# Patient Record
Sex: MALE | Race: WHITE | NOT HISPANIC OR LATINO | URBAN - METROPOLITAN AREA
[De-identification: names, ages, dates, MRNs, and addresses within clinical notes are randomized per-mention and may not be internally consistent; named-entity substitution may affect disease eponyms.]

---

## 2023-07-07 ENCOUNTER — OFFICE VISIT (OUTPATIENT)
Dept: PRIMARY CARE | Facility: CLINIC | Age: 48
End: 2023-07-07
Payer: COMMERCIAL

## 2023-07-07 VITALS
BODY MASS INDEX: 30.77 KG/M2 | HEIGHT: 77 IN | WEIGHT: 260.6 LBS | DIASTOLIC BLOOD PRESSURE: 88 MMHG | HEART RATE: 75 BPM | SYSTOLIC BLOOD PRESSURE: 144 MMHG | OXYGEN SATURATION: 97 %

## 2023-07-07 DIAGNOSIS — E78.2 MIXED HYPERLIPIDEMIA: ICD-10-CM

## 2023-07-07 DIAGNOSIS — I10 HYPERTENSION, UNSPECIFIED TYPE: Primary | ICD-10-CM

## 2023-07-07 PROBLEM — J30.2 SEASONAL ALLERGIES: Status: RESOLVED | Noted: 2018-12-21 | Resolved: 2023-07-07

## 2023-07-07 PROBLEM — K21.9 GERD (GASTROESOPHAGEAL REFLUX DISEASE): Status: ACTIVE | Noted: 2017-07-06

## 2023-07-07 PROBLEM — E78.00 PURE HYPERCHOLESTEROLEMIA: Status: ACTIVE | Noted: 2023-02-11

## 2023-07-07 PROBLEM — E66.811 OBESITY, CLASS I, BMI 30-34.9: Status: ACTIVE | Noted: 2022-06-28

## 2023-07-07 PROBLEM — J02.9 SORE THROAT: Status: RESOLVED | Noted: 2023-07-07 | Resolved: 2023-07-07

## 2023-07-07 PROBLEM — J30.9 ALLERGIC RHINITIS: Status: ACTIVE | Noted: 2023-07-07

## 2023-07-07 PROBLEM — J30.9 ALLERGIC RHINITIS: Status: RESOLVED | Noted: 2023-07-07 | Resolved: 2023-07-07

## 2023-07-07 PROBLEM — K21.9 GASTROESOPHAGEAL REFLUX DISEASE: Status: ACTIVE | Noted: 2023-07-07

## 2023-07-07 PROBLEM — J30.2 SEASONAL ALLERGIES: Status: ACTIVE | Noted: 2018-12-21

## 2023-07-07 PROBLEM — J02.9 SORE THROAT: Status: ACTIVE | Noted: 2023-07-07

## 2023-07-07 PROBLEM — J01.90 ACUTE SINUSITIS: Status: ACTIVE | Noted: 2023-07-07

## 2023-07-07 PROBLEM — E78.00 PURE HYPERCHOLESTEROLEMIA: Status: RESOLVED | Noted: 2023-02-11 | Resolved: 2023-07-07

## 2023-07-07 PROBLEM — J01.90 ACUTE SINUSITIS: Status: RESOLVED | Noted: 2023-07-07 | Resolved: 2023-07-07

## 2023-07-07 PROBLEM — E78.00 HIGH BLOOD CHOLESTEROL: Status: ACTIVE | Noted: 2023-07-07

## 2023-07-07 PROBLEM — E78.00 HIGH BLOOD CHOLESTEROL: Status: RESOLVED | Noted: 2023-07-07 | Resolved: 2023-07-07

## 2023-07-07 PROBLEM — K21.9 GERD (GASTROESOPHAGEAL REFLUX DISEASE): Status: RESOLVED | Noted: 2017-07-06 | Resolved: 2023-07-07

## 2023-07-07 PROBLEM — E66.9 OBESITY, CLASS I, BMI 30-34.9: Status: ACTIVE | Noted: 2022-06-28

## 2023-07-07 PROCEDURE — 3077F SYST BP >= 140 MM HG: CPT | Performed by: INTERNAL MEDICINE

## 2023-07-07 PROCEDURE — 99203 OFFICE O/P NEW LOW 30 MIN: CPT | Performed by: INTERNAL MEDICINE

## 2023-07-07 PROCEDURE — 3079F DIAST BP 80-89 MM HG: CPT | Performed by: INTERNAL MEDICINE

## 2023-07-07 RX ORDER — OMEPRAZOLE 20 MG/1
20 CAPSULE, DELAYED RELEASE ORAL DAILY
COMMUNITY
End: 2024-01-17 | Stop reason: SDUPTHER

## 2023-07-07 RX ORDER — AMLODIPINE BESYLATE 5 MG/1
5 TABLET ORAL DAILY
Qty: 90 TABLET | Refills: 2 | Status: SHIPPED | OUTPATIENT
Start: 2023-07-07 | End: 2024-03-26

## 2023-07-07 RX ORDER — FLUTICASONE PROPIONATE 50 MCG
1 SPRAY, SUSPENSION (ML) NASAL
COMMUNITY
Start: 2022-08-18

## 2023-07-07 RX ORDER — EZETIMIBE 10 MG/1
10 TABLET ORAL DAILY
COMMUNITY
End: 2023-07-07 | Stop reason: ALTCHOICE

## 2023-07-07 RX ORDER — MONTELUKAST SODIUM 10 MG/1
10 TABLET ORAL NIGHTLY
COMMUNITY

## 2023-07-07 RX ORDER — ATORVASTATIN CALCIUM 10 MG/1
10 TABLET, FILM COATED ORAL DAILY
Qty: 90 TABLET | Refills: 2 | Status: SHIPPED | OUTPATIENT
Start: 2023-07-07 | End: 2024-03-26

## 2023-07-07 RX ORDER — HYDROCHLOROTHIAZIDE 25 MG/1
25 TABLET ORAL DAILY
COMMUNITY
End: 2023-07-07 | Stop reason: ALTCHOICE

## 2023-07-07 RX ORDER — AMLODIPINE BESYLATE 5 MG/1
5 TABLET ORAL DAILY
COMMUNITY
End: 2023-07-07 | Stop reason: SDUPTHER

## 2023-07-07 RX ORDER — ATORVASTATIN CALCIUM 10 MG/1
10 TABLET, FILM COATED ORAL DAILY
COMMUNITY
End: 2023-07-07 | Stop reason: SDUPTHER

## 2023-07-07 RX ORDER — LORATADINE 10 MG/1
10 TABLET ORAL DAILY
COMMUNITY

## 2023-07-07 ASSESSMENT — PATIENT HEALTH QUESTIONNAIRE - PHQ9
SUM OF ALL RESPONSES TO PHQ9 QUESTIONS 1 AND 2: 0
2. FEELING DOWN, DEPRESSED OR HOPELESS: NOT AT ALL
1. LITTLE INTEREST OR PLEASURE IN DOING THINGS: NOT AT ALL

## 2023-07-07 NOTE — PROGRESS NOTES
"Subjective   Patient ID: CONCHITA Long is a 47 y.o. male who presents for the following    Previous patient of [facility] of Dr Reynaga, here to establish    Assessment/Plan   Htn: stable, go back on amlodipine     Hld: will switch back to atorvastatin     Reduced orgasm: recommend not have any intercourse for a week and then see what happens.     Needs fasting blood work and PSA drawn at next appointment in 3-4 months    HPI    HTN: patient denies any headaches, blurred vision or dizziness. patient denies any stroke like symptoms    HLD: Patient denies any muscle aches and is tolerating statin therapy    Reduced orgasms now. Messing with his head. He can have erection and he can ejaculate but the feeling afterward has changed. He did have his amlodipine switched to hydrochlorothiazide and atorvastatin switched to zetia without it helping. It does sound that he is frequently having intercourse.      social: patient denies any smoking, drug or alcohol abuse    family history: sister marysol liver disease bipolar disease, passed; mom and dad alive     surgical history: left biceps tendon       Visit Vitals  /88   Pulse 75   Ht 1.956 m (6' 5\")   Wt 118 kg (260 lb 9.6 oz)   SpO2 97%   BMI 30.90 kg/m²   Smoking Status Every Day   BSA 2.53 m²     PHYSICAL EXAM   General appearance: Alert and in no acute distress. speech is clear and coherent  HEENT: Sclera and conjunctiva white, EOMI, uvela midline, no mouth lesions. PERRLA,  nasal turbinates are not swollen without exudate. TM's Wooten with cone of light, external ear canal with scant cerumen. No head trauma  Neck: no carotid bruits or thyromegaly. no lymphadenopathy   Respiratory : No respiratory distress, normal respiratory rhythm and effort. Clear bilateral breath sounds. No wheezing or rhonchi.   Cardiovascular: heart rate regular, S1, S2. no murmurs. no Lower extremity edema  Skin inspection: Normal skin color and pigmentation, normal skin turgor and no visible rash, " induration, or cellulitis  MSK: 5/5 strength upper and lower extremities without gait abnormalities. no loss of muscle mass   Neuro: 2-12 CN grossly intact.  no slurred speech. no lateralizing deficit  Psychiatric Orientation: Oriented to person, place, and time. no depression, homicidal or suicidal thoughts, normal affect       REVIEW OF SYSTEMS   Constitutional: not feeling tired and no fever, chills or sweats. Denies weight loss    HEENT: no earache and no sore throat. no blurred vision and or double vision. no headache  Cardiovascular: no exertional chest pain, no palpitations, no lower extremity edema    Lungs: Denies shortness of breath, exertional dyspnea, wheezing  Gastrointestinal: no change in bowel habits, no diarrhea, no nausea, no vomiting and no abdominal pain. Denies Melena, brbpr or dark stool  Musculoskeletal: no myalgias, no muscle weakness and no limb swelling.    Neurological: no headaches, no seizures, no numbness, no lateralizing deficits and no fainting.   Psychiatric: no depression and no anxiety.   Urine: denies polyuria, hematuria, dysuria        No Known Allergies    Current Outpatient Medications   Medication Sig Dispense Refill    amLODIPine (Norvasc) 5 mg tablet Take 1 tablet (5 mg) by mouth once daily.      atorvastatin (Lipitor) 10 mg tablet Take 1 tablet (10 mg) by mouth once daily.      ezetimibe (Zetia) 10 mg tablet Take 1 tablet (10 mg) by mouth once daily.      fluticasone (Flonase) 50 mcg/actuation nasal spray 1 spray by Does not apply route once daily.      hydroCHLOROthiazide (HYDRODiuril) 25 mg tablet Take 1 tablet (25 mg) by mouth once daily.      loratadine (Claritin) 10 mg tablet Take 1 tablet (10 mg) by mouth once daily.      montelukast (Singulair) 10 mg tablet Take 1 tablet (10 mg) by mouth once daily at bedtime.      omeprazole (PriLOSEC) 20 mg DR capsule Take 1 capsule (20 mg) by mouth once daily.       No current facility-administered medications for this visit.        Objective     No visits with results within 4 Month(s) from this visit.   Latest known visit with results is:   No results found for any previous visit.       Radiology: Reviewed imaging in powerchart.  No results found.    No family history on file.  Social History     Socioeconomic History    Marital status:      Spouse name: None    Number of children: None    Years of education: None    Highest education level: None   Occupational History    None   Tobacco Use    Smoking status: Every Day     Packs/day: 0.25     Years: 25.00     Total pack years: 6.25     Types: Cigarettes    Smokeless tobacco: Never   Substance and Sexual Activity    Alcohol use: Yes     Alcohol/week: 10.0 standard drinks of alcohol     Types: 10 Standard drinks or equivalent per week    Drug use: Never    Sexual activity: None   Other Topics Concern    None   Social History Narrative    None     Social Determinants of Health     Financial Resource Strain: Not on file   Food Insecurity: Not on file   Transportation Needs: Not on file   Physical Activity: Not on file   Stress: Not on file   Social Connections: Not on file   Intimate Partner Violence: Not on file   Housing Stability: Not on file     No past medical history on file.  No past surgical history on file.    Charting was completed using voice recognition technology and may include unintended errors.

## 2023-09-29 DIAGNOSIS — Z00.00 HEALTH CARE MAINTENANCE: ICD-10-CM

## 2023-09-29 LAB
NON-UH HIE CALCULATED LDL CHOLESTEROL: 114 MG/DL (ref 60–130)
NON-UH HIE CHOLESTEROL: 178 MG/DL (ref 100–200)
NON-UH HIE HDL CHOLESTEROL: 42 MG/DL (ref 40–60)
NON-UH HIE PROSTATIC SPECIFIC AG SCREEN: 0.5 NG/ML (ref 0–4)
NON-UH HIE TOTAL CHOL/HDL CHOL RATIO: 4.2
NON-UH HIE TRIGLYCERIDES: 110 MG/DL (ref 30–150)

## 2023-11-02 ASSESSMENT — PROMIS GLOBAL HEALTH SCALE
CARRYOUT_PHYSICAL_ACTIVITIES: COMPLETELY
RATE_SOCIAL_SATISFACTION: VERY GOOD
RATE_AVERAGE_PAIN: 0
EMOTIONAL_PROBLEMS: RARELY
RATE_PHYSICAL_HEALTH: VERY GOOD
RATE_QUALITY_OF_LIFE: VERY GOOD
RATE_MENTAL_HEALTH: VERY GOOD
RATE_GENERAL_HEALTH: VERY GOOD
CARRYOUT_SOCIAL_ACTIVITIES: EXCELLENT

## 2023-11-03 ENCOUNTER — OFFICE VISIT (OUTPATIENT)
Dept: PRIMARY CARE | Facility: CLINIC | Age: 48
End: 2023-11-03
Payer: COMMERCIAL

## 2023-11-03 VITALS
HEART RATE: 68 BPM | TEMPERATURE: 97.1 F | WEIGHT: 268.4 LBS | OXYGEN SATURATION: 98 % | HEIGHT: 77 IN | BODY MASS INDEX: 31.69 KG/M2

## 2023-11-03 DIAGNOSIS — Z00.00 HEALTH CARE MAINTENANCE: ICD-10-CM

## 2023-11-03 DIAGNOSIS — Z00.00 ANNUAL PHYSICAL EXAM: Primary | ICD-10-CM

## 2023-11-03 LAB
NON-UH HIE A/G RATIO: 1.6
NON-UH HIE ALB: 4.3 G/DL (ref 3.4–5)
NON-UH HIE ALK PHOS: 56 UNIT/L (ref 45–117)
NON-UH HIE BILIRUBIN, TOTAL: 0.7 MG/DL (ref 0.3–1.2)
NON-UH HIE BUN/CREAT RATIO: 13.6
NON-UH HIE BUN: 15 MG/DL (ref 9–23)
NON-UH HIE CALCIUM: 9.6 MG/DL (ref 8.7–10.4)
NON-UH HIE CALCULATED OSMOLALITY: 284 MOSM/KG (ref 275–295)
NON-UH HIE CHLORIDE: 106 MMOL/L (ref 98–107)
NON-UH HIE CO2, VENOUS: 27 MMOL/L (ref 20–31)
NON-UH HIE CREATININE: 1.1 MG/DL (ref 0.6–1.1)
NON-UH HIE GFR AA: >60
NON-UH HIE GLOBULIN: 2.7 G/DL
NON-UH HIE GLOMERULAR FILTRATION RATE: >60 ML/MIN/1.73M?
NON-UH HIE GLUCOSE: 106 MG/DL (ref 74–106)
NON-UH HIE GOT: 24 UNIT/L (ref 15–37)
NON-UH HIE GPT: 37 UNIT/L (ref 10–49)
NON-UH HIE HCT: 46.2 % (ref 41–52)
NON-UH HIE HGB A1C: 5.1 %
NON-UH HIE HGB: 16 G/DL (ref 13.5–17.5)
NON-UH HIE INSTR WBC ND: 4
NON-UH HIE K: 4.8 MMOL/L (ref 3.5–5.1)
NON-UH HIE MCH: 32.4 PG (ref 27–34)
NON-UH HIE MCHC: 34.6 G/DL (ref 32–37)
NON-UH HIE MCV: 93.6 FL (ref 80–100)
NON-UH HIE MPV: 8.1 FL (ref 7.4–10.4)
NON-UH HIE NA: 142 MMOL/L (ref 135–145)
NON-UH HIE PLATELET: 205 X10 (ref 150–450)
NON-UH HIE RBC: 4.93 X10 (ref 4.7–6.1)
NON-UH HIE RDW: 13.2 % (ref 11.5–14.5)
NON-UH HIE TOTAL PROTEIN: 7 G/DL (ref 5.7–8.2)
NON-UH HIE TSH: 2.05 UIU/ML (ref 0.55–4.78)
NON-UH HIE VIT D 25: 25 NG/ML
NON-UH HIE WBC: 4 X10 (ref 4.5–11)

## 2023-11-03 PROCEDURE — 3077F SYST BP >= 140 MM HG: CPT | Performed by: INTERNAL MEDICINE

## 2023-11-03 PROCEDURE — 90471 IMMUNIZATION ADMIN: CPT | Performed by: INTERNAL MEDICINE

## 2023-11-03 PROCEDURE — 90686 IIV4 VACC NO PRSV 0.5 ML IM: CPT | Performed by: INTERNAL MEDICINE

## 2023-11-03 PROCEDURE — 93000 ELECTROCARDIOGRAM COMPLETE: CPT | Performed by: INTERNAL MEDICINE

## 2023-11-03 PROCEDURE — 99396 PREV VISIT EST AGE 40-64: CPT | Performed by: INTERNAL MEDICINE

## 2023-11-03 PROCEDURE — 3079F DIAST BP 80-89 MM HG: CPT | Performed by: INTERNAL MEDICINE

## 2023-11-03 NOTE — PROGRESS NOTES
"Subjective   Patient ID: CONCHITA Long is a 47 y.o. male who presents for the following    PHYSICAL 11/3/2023    Previous patient of [facility] of Dr Reynaga, here to establish    Assessment/Plan   HTN: stable on amlodipine.     HLD: stable on lipitor.     Reduced orgasm:  reassured the patient. No ED present at this time    Currently smoking, he knows he needs to quite    CBC, CMP, lipid panel, a1c, tsh, vitamin d  psa    HPI  Male with htn, hld     HTN: patient denies any headaches, blurred vision or dizziness. patient denies any stroke like symptoms    HLD: Patient denies any muscle aches and is tolerating statin therapy     social: patient denies any smoking, drug or alcohol abuse. Scotch nightly     family history: sister marysol liver disease bipolar disease, passed; mom and dad alive     surgical history: left biceps tendon      with two 7 year old boys    Visit Vitals  Pulse 68   Temp 36.2 °C (97.1 °F)   Ht 1.956 m (6' 5\")   Wt 122 kg (268 lb 6.4 oz)   SpO2 98%   BMI 31.83 kg/m²   Smoking Status Every Day   BSA 2.57 m²     PHYSICAL EXAM   General appearance: Alert and in no acute distress. speech is clear and coherent  HEENT: Sclera and conjunctiva white, EOMI, uvela midline, no mouth lesions. PERRLA,  nasal turbinates are not swollen without exudate. TM's Wooten with cone of light, external ear canal with scant cerumen. No head trauma  Neck: no carotid bruits or thyromegaly. no lymphadenopathy   Respiratory : No respiratory distress, normal respiratory rhythm and effort. Clear bilateral breath sounds. No wheezing or rhonchi.   Cardiovascular: heart rate regular, S1, S2. no murmurs. no Lower extremity edema  Skin inspection: Normal skin color and pigmentation, normal skin turgor and no visible rash, induration, or cellulitis  MSK: 5/5 strength upper and lower extremities without gait abnormalities. no loss of muscle mass   Neuro: 2-12 CN grossly intact.  no slurred speech. no lateralizing deficit  Psychiatric " Orientation: Oriented to person, place, and time. no depression, homicidal or suicidal thoughts, normal affect  Abdomen: soft, none tender, none distended. no organomegaly      REVIEW OF SYSTEMS   Constitutional: not feeling tired and no fever, chills or sweats. Denies weight loss    HEENT: no earache and no sore throat. no blurred vision and or double vision. no headache  Cardiovascular: no exertional chest pain, no palpitations, no lower extremity edema    Lungs: Denies shortness of breath, exertional dyspnea, wheezing  Gastrointestinal: no change in bowel habits, no diarrhea, no nausea, no vomiting and no abdominal pain. Denies Melena, brbpr or dark stool  Musculoskeletal: no myalgias, no muscle weakness and no limb swelling.    Neurological: no headaches, no seizures, no numbness, no lateralizing deficits and no fainting.   Psychiatric: no depression and no anxiety.   Urine: denies polyuria, hematuria, dysuria        No Known Allergies    Current Outpatient Medications   Medication Sig Dispense Refill    amLODIPine (Norvasc) 5 mg tablet Take 1 tablet (5 mg) by mouth once daily. 90 tablet 2    atorvastatin (Lipitor) 10 mg tablet Take 1 tablet (10 mg) by mouth once daily. 90 tablet 2    fluticasone (Flonase) 50 mcg/actuation nasal spray 1 spray by Does not apply route once daily.      loratadine (Claritin) 10 mg tablet Take 1 tablet (10 mg) by mouth once daily.      montelukast (Singulair) 10 mg tablet Take 1 tablet (10 mg) by mouth once daily at bedtime.      omeprazole (PriLOSEC) 20 mg DR capsule Take 1 capsule (20 mg) by mouth once daily.       No current facility-administered medications for this visit.       Objective     Orders Only on 09/29/2023   Component Date Value Ref Range Status    [facility] Prostatic Specific Ag S* 09/29/2023 0.5  0.0 - 4.0 ng/mL Final    [facility] Total Chol/HDL Chol Rat* 09/29/2023 4.2   Final    [facility] Cholesterol 09/29/2023 178  100 - 200 mg/dL Final    [facility]  Calculated LDL Choleste* 09/29/2023 114  60 - 130 mg/dL Final    [facility] Triglycerides 09/29/2023 110  30 - 150 mg/dL Final    [facility] HDL Cholesterol 09/29/2023 42  40 - 60 mg/dL Final       Radiology: Reviewed imaging in powerchart.  No results found.    No family history on file.  Social History     Socioeconomic History    Marital status:      Spouse name: None    Number of children: None    Years of education: None    Highest education level: None   Occupational History    None   Tobacco Use    Smoking status: Every Day     Packs/day: 0.25     Years: 25.00     Additional pack years: 0.00     Total pack years: 6.25     Types: Cigarettes    Smokeless tobacco: Never   Substance and Sexual Activity    Alcohol use: Yes     Alcohol/week: 10.0 standard drinks of alcohol     Types: 10 Standard drinks or equivalent per week    Drug use: Never    Sexual activity: None   Other Topics Concern    None   Social History Narrative    None     Social Determinants of Health     Financial Resource Strain: Not on file   Food Insecurity: Not on file   Transportation Needs: Not on file   Physical Activity: Not on file   Stress: Not on file   Social Connections: Not on file   Intimate Partner Violence: Not on file   Housing Stability: Not on file     No past medical history on file.  No past surgical history on file.    Charting was completed using voice recognition technology and may include unintended errors.

## 2023-11-06 DIAGNOSIS — Z80.0 FAMILY HISTORY OF COLON CANCER: ICD-10-CM

## 2023-12-11 ENCOUNTER — TELEPHONE (OUTPATIENT)
Dept: PRIMARY CARE | Facility: CLINIC | Age: 48
End: 2023-12-11
Payer: COMMERCIAL

## 2023-12-12 NOTE — TELEPHONE ENCOUNTER
----- Message from Randy Long sent at 12/12/2023  1:02 PM EST -----  Regarding: Gastroenterologist   Contact: [phone]  Sorry to bother you about this, but when at my appointment with Dr. Pearson's office my latest EKG came up....he suggested I talk to you to get it redone to make sure before I go under the anesthesia for endoscopy/colonoscopy?

## 2024-01-08 ENCOUNTER — OFFICE VISIT (OUTPATIENT)
Dept: PRIMARY CARE | Facility: CLINIC | Age: 49
End: 2024-01-08
Payer: COMMERCIAL

## 2024-01-08 VITALS
OXYGEN SATURATION: 98 % | DIASTOLIC BLOOD PRESSURE: 86 MMHG | BODY MASS INDEX: 31.88 KG/M2 | SYSTOLIC BLOOD PRESSURE: 136 MMHG | HEART RATE: 111 BPM | HEIGHT: 77 IN | WEIGHT: 270 LBS

## 2024-01-08 DIAGNOSIS — I10 HYPERTENSION, UNSPECIFIED TYPE: Primary | ICD-10-CM

## 2024-01-08 PROCEDURE — 3075F SYST BP GE 130 - 139MM HG: CPT | Performed by: INTERNAL MEDICINE

## 2024-01-08 PROCEDURE — 3079F DIAST BP 80-89 MM HG: CPT | Performed by: INTERNAL MEDICINE

## 2024-01-08 PROCEDURE — 93000 ELECTROCARDIOGRAM COMPLETE: CPT | Performed by: INTERNAL MEDICINE

## 2024-01-08 PROCEDURE — 99212 OFFICE O/P EST SF 10 MIN: CPT | Performed by: INTERNAL MEDICINE

## 2024-01-08 NOTE — PROGRESS NOTES
"Subjective   Patient ID: CONCHITA Long is a 48 y.o. male who presents for the following    Follow up on EKG  PHYSICAL 11/3/2023    Previous patient of [facility] of Dr Reynaga, here to establish    Assessment/Plan   Poor \"r wave\" progression (likely due to body habitus): patient has no signs of hear heat failure or chest pain or pressure. repeat EKG is consistent with previous He is okay to get endoscopy     Other medical issues, see below  HTN: stable on amlodipine.     HLD: stable on lipitor.     Reduced orgasm:  reassured the patient. No ED present at this time        HPI  Male with htn, hld     Patient went to see Gi doctor who plans to do egd and colonoscopy. Patient brought up that the EKG on his physical was abnormal (poor r wave progression). This likely is due to lead placement and distance form the chest wall as patient is physical larger in the chest.     HTN: patient denies any headaches, blurred vision or dizziness. patient denies any stroke like symptoms    HLD: Patient denies any muscle aches and is tolerating statin therapy     social: patient denies any smoking, drug or alcohol abuse. Scotch nightly     family history: sister marysol liver disease bipolar disease, passed; mom and dad alive     surgical history: left biceps tendon      with two 7 year old boys    Visit Vitals  /86   Pulse (!) 111   Ht 1.956 m (6' 5\")   Wt 122 kg (270 lb)   SpO2 98%   BMI 32.02 kg/m²   Smoking Status Every Day   BSA 2.57 m²     PHYSICAL EXAM   General appearance: Alert and in no acute distress. speech is clear and coherent  HEENT: Sclera and conjunctiva white, EOMI,    Respiratory : No respiratory distress, normal respiratory rhythm and effort. Clear bilateral breath sounds. No wheezing or rhonchi.   Cardiovascular: heart rate regular, S1, S2. no murmurs. no Lower extremity edema   MSK: 5/5 strength upper and lower extremities without gait abnormalities. no loss of muscle mass   Neuro: 2-12 CN grossly intact.  no " slurred speech. no lateralizing deficit  Psychiatric Orientation: Oriented to person, place, and time. no depression, homicidal or suicidal thoughts, normal affect       REVIEW OF SYSTEMS   Constitutional: not feeling tired and no fever, chills or sweats.  no headache  Cardiovascular: no exertional chest pain, no palpitations, no lower extremity edema    Lungs: Denies shortness of breath, exertional dyspnea, wheezing  Gastrointestinal: no change in bowel habits, no diarrhea, no nausea,    Psychiatric: no depression and no anxiety.   Urine: denies polyuria, hematuria, dysuria        No Known Allergies    Current Outpatient Medications   Medication Sig Dispense Refill    amLODIPine (Norvasc) 5 mg tablet Take 1 tablet (5 mg) by mouth once daily. 90 tablet 2    atorvastatin (Lipitor) 10 mg tablet Take 1 tablet (10 mg) by mouth once daily. 90 tablet 2    fluticasone (Flonase) 50 mcg/actuation nasal spray 1 spray by Does not apply route once daily.      loratadine (Claritin) 10 mg tablet Take 1 tablet (10 mg) by mouth once daily.      montelukast (Singulair) 10 mg tablet Take 1 tablet (10 mg) by mouth once daily at bedtime.      omeprazole (PriLOSEC) 20 mg DR capsule Take 1 capsule (20 mg) by mouth once daily.       No current facility-administered medications for this visit.       Objective     Orders Only on 11/03/2023   Component Date Value Ref Range Status    [facility] MCH 11/03/2023 32.4  27.0 - 34.0 pg Final    [facility] HCT 11/03/2023 46.2  41.0 - 52.0 % Final    [facility] Platelet 11/03/2023 205  150 - 450 x10 Final    [facility] RBC 11/03/2023 4.93  4.70 - 6.10 x10 Final    [facility] Instr WBC ND 11/03/2023 4.0   Final    [facility] MCHC 11/03/2023 34.6  32.0 - 37.0 g/dL Final    [facility] MCV 11/03/2023 93.6  80.0 - 100.0 fL Final    [facility] MPV 11/03/2023 8.1  7.4 - 10.4 fL Final    [facility] HGB 11/03/2023 16.0  13.5 - 17.5 g/dL Final    [facility] WBC 11/03/2023 4.0 (L)  4.5 - 11.0 x10 Final     [facility] RDW 11/03/2023 13.2  11.5 - 14.5 % Final    [facility] Vit D 25 11/03/2023 25  ng/mL Final    [facility] Globulin 11/03/2023 2.7  g/dL Final    [facility] Calcium 11/03/2023 9.6  8.7 - 10.4 mg/dL Final    [facility] GOT 11/03/2023 24  15 - 37 unit/L Final    [facility] Glucose 11/03/2023 106  74 - 106 mg/dL Final    [facility] BUN 11/03/2023 15  9 - 23 mg/dL Final    [facility] GFR AA 11/03/2023 >60   Final    [facility] Bilirubin, Total 11/03/2023 0.70  0.30 - 1.20 mg/dL Final    [facility] Na 11/03/2023 142  135 - 145 mmol/L Final    [facility] ALB 11/03/2023 4.3  3.4 - 5.0 g/dL Final    [facility] Chloride 11/03/2023 106  98 - 107 mmol/L Final    [facility] A/G Ratio 11/03/2023 1.6   Final    [facility] BUN/Creat Ratio 11/03/2023 13.6   Final    [facility] GPT 11/03/2023 37  10 - 49 unit/L Final    [facility] Creatinine 11/03/2023 1.1  0.6 - 1.1 mg/dL Final    [facility] Total Protein 11/03/2023 7.0  5.7 - 8.2 g/dL Final    [facility] CO2, venous 11/03/2023 27.0  20.0 - 31.0 mmol/L Final    [facility] Alk Phos 11/03/2023 56  45 - 117 unit/L Final    [facility] Glomerular Filtration R* 11/03/2023 >60  mL/min/1.73m? Final    [facility] Calculated Osmolality 11/03/2023 284  275 - 295 mOsm/kg Final    [facility] K 11/03/2023 4.8  3.5 - 5.1 mmol/L Final    [facility] TSH 11/03/2023 2.05  0.55 - 4.78 uIU/ml Final    [facility] HGB A1C 11/03/2023 5.1  % Final   Orders Only on 09/29/2023   Component Date Value Ref Range Status    [facility] Prostatic Specific Ag S* 09/29/2023 0.5  0.0 - 4.0 ng/mL Final    [facility] Total Chol/HDL Chol Rat* 09/29/2023 4.2   Final    [facility] Cholesterol 09/29/2023 178  100 - 200 mg/dL Final    [facility] Calculated LDL Choleste* 09/29/2023 114  60 - 130 mg/dL Final    [facility] Triglycerides 09/29/2023 110  30 - 150 mg/dL Final    [facility] HDL Cholesterol 09/29/2023 42  40 - 60 mg/dL Final       Radiology: Reviewed imaging in powerchart.  No results found.    No family  history on file.  Social History     Socioeconomic History    Marital status:      Spouse name: None    Number of children: None    Years of education: None    Highest education level: None   Occupational History    None   Tobacco Use    Smoking status: Every Day     Packs/day: 0.25     Years: 25.00     Additional pack years: 0.00     Total pack years: 6.25     Types: Cigarettes    Smokeless tobacco: Never   Substance and Sexual Activity    Alcohol use: Yes     Alcohol/week: 10.0 standard drinks of alcohol     Types: 10 Standard drinks or equivalent per week    Drug use: Never    Sexual activity: None   Other Topics Concern    None   Social History Narrative    None     Social Determinants of Health     Financial Resource Strain: Not on file   Food Insecurity: Not on file   Transportation Needs: Not on file   Physical Activity: Not on file   Stress: Not on file   Social Connections: Not on file   Intimate Partner Violence: Not on file   Housing Stability: Not on file     No past medical history on file.  No past surgical history on file.    Charting was completed using voice recognition technology and may include unintended errors.

## 2024-01-17 DIAGNOSIS — K21.00 GASTROESOPHAGEAL REFLUX DISEASE WITH ESOPHAGITIS WITHOUT HEMORRHAGE: Primary | ICD-10-CM

## 2024-01-17 RX ORDER — OMEPRAZOLE 20 MG/1
20 CAPSULE, DELAYED RELEASE ORAL DAILY
Qty: 90 CAPSULE | Refills: 1 | Status: SHIPPED | OUTPATIENT
Start: 2024-01-17 | End: 2025-01-16

## 2024-03-26 DIAGNOSIS — I10 HYPERTENSION, UNSPECIFIED TYPE: ICD-10-CM

## 2024-03-26 RX ORDER — ATORVASTATIN CALCIUM 10 MG/1
10 TABLET, FILM COATED ORAL DAILY
Qty: 90 TABLET | Refills: 3 | Status: SHIPPED | OUTPATIENT
Start: 2024-03-26

## 2024-03-26 RX ORDER — AMLODIPINE BESYLATE 5 MG/1
5 TABLET ORAL DAILY
Qty: 90 TABLET | Refills: 3 | Status: SHIPPED | OUTPATIENT
Start: 2024-03-26

## 2024-11-01 DIAGNOSIS — K21.00 GASTROESOPHAGEAL REFLUX DISEASE WITH ESOPHAGITIS WITHOUT HEMORRHAGE: ICD-10-CM

## 2024-11-01 RX ORDER — OMEPRAZOLE 20 MG/1
20 CAPSULE, DELAYED RELEASE ORAL DAILY
Qty: 90 CAPSULE | Refills: 1 | Status: SHIPPED | OUTPATIENT
Start: 2024-11-01 | End: 2025-11-01

## 2024-11-26 DIAGNOSIS — M54.50 LOW BACK PAIN, UNSPECIFIED BACK PAIN LATERALITY, UNSPECIFIED CHRONICITY, UNSPECIFIED WHETHER SCIATICA PRESENT: ICD-10-CM

## 2024-12-11 DIAGNOSIS — R09.81 SINUS CONGESTION: Primary | ICD-10-CM

## 2024-12-12 RX ORDER — FLUTICASONE PROPIONATE 50 MCG
1 SPRAY, SUSPENSION (ML) NASAL
Qty: 16 G | Refills: 0 | Status: SHIPPED | OUTPATIENT
Start: 2024-12-12

## 2025-01-03 DIAGNOSIS — R09.81 SINUS CONGESTION: ICD-10-CM

## 2025-01-03 RX ORDER — FLUTICASONE PROPIONATE 50 MCG
1 SPRAY, SUSPENSION (ML) NASAL
Qty: 48 ML | Refills: 1 | Status: SHIPPED | OUTPATIENT
Start: 2025-01-03

## 2025-01-27 DIAGNOSIS — K21.00 GASTROESOPHAGEAL REFLUX DISEASE WITH ESOPHAGITIS WITHOUT HEMORRHAGE: ICD-10-CM

## 2025-01-27 RX ORDER — OMEPRAZOLE 20 MG/1
20 CAPSULE, DELAYED RELEASE ORAL DAILY
Qty: 90 CAPSULE | Refills: 3 | Status: SHIPPED | OUTPATIENT
Start: 2025-01-27

## 2025-01-27 ASSESSMENT — PROMIS GLOBAL HEALTH SCALE
RATE_MENTAL_HEALTH: EXCELLENT
CARRYOUT_SOCIAL_ACTIVITIES: EXCELLENT
CARRYOUT_PHYSICAL_ACTIVITIES: COMPLETELY
RATE_SOCIAL_SATISFACTION: EXCELLENT
RATE_PHYSICAL_HEALTH: GOOD
RATE_QUALITY_OF_LIFE: VERY GOOD
EMOTIONAL_PROBLEMS: SOMETIMES
RATE_GENERAL_HEALTH: GOOD
RATE_AVERAGE_PAIN: 4

## 2025-01-31 ENCOUNTER — APPOINTMENT (OUTPATIENT)
Dept: PRIMARY CARE | Facility: CLINIC | Age: 50
End: 2025-01-31
Payer: COMMERCIAL

## 2025-01-31 VITALS
OXYGEN SATURATION: 96 % | BODY MASS INDEX: 31.64 KG/M2 | HEIGHT: 77 IN | HEART RATE: 85 BPM | SYSTOLIC BLOOD PRESSURE: 138 MMHG | WEIGHT: 268 LBS | DIASTOLIC BLOOD PRESSURE: 74 MMHG

## 2025-01-31 DIAGNOSIS — Z00.00 ANNUAL PHYSICAL EXAM: Primary | ICD-10-CM

## 2025-01-31 PROCEDURE — 3078F DIAST BP <80 MM HG: CPT | Performed by: INTERNAL MEDICINE

## 2025-01-31 PROCEDURE — 3008F BODY MASS INDEX DOCD: CPT | Performed by: INTERNAL MEDICINE

## 2025-01-31 PROCEDURE — 3075F SYST BP GE 130 - 139MM HG: CPT | Performed by: INTERNAL MEDICINE

## 2025-01-31 PROCEDURE — 93000 ELECTROCARDIOGRAM COMPLETE: CPT | Performed by: INTERNAL MEDICINE

## 2025-01-31 PROCEDURE — 99396 PREV VISIT EST AGE 40-64: CPT | Performed by: INTERNAL MEDICINE

## 2025-01-31 NOTE — PROGRESS NOTES
"Subjective   Patient ID: CONCHITA Long is a 49 y.o. male who presents for the following    Follow up on EKG  PHYSICAL  1/31/25    Previous patient of [facility] of Dr Reynaga, here to establish    Assessment/Plan   Right SI joint inflammation: medrol dose pack     Poor \"r wave\" progression (likely due to body habitus): patient has no signs of hear heat failure or chest pain or pressure. repeat EKG is consistent with previous He is okay to get endoscopy     HTN: stable on amlodipine.     HLD: stable on lipitor.          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 He say that he turned the wrong way on his tractor and has pain on the right lower back ever since.     Other medical issues see below    Patient went to see Gi doctor who plans to do egd and colonoscopy. Patient brought up that the EKG on his physical was abnormal (poor r wave progression). This likely is due to lead placement and distance form the chest wall as patient is physical larger in the chest.     HTN: patient denies any headaches, blurred vision or dizziness. patient denies any stroke like symptoms    HLD: Patient denies any muscle aches and is tolerating statin therapy     social: patient denies any smoking, drug or alcohol abuse. Scotch nightly     family history: sister marysol liver disease bipolar disease, passed; mom and dad alive     surgical history: left biceps tendon      with two 7 year old boys    Visit Vitals  /74 (BP Location: Right arm, Patient Position: Sitting, BP Cuff Size: Large adult)   Pulse 85   Ht 1.956 m (6' 5\")   Wt 122 kg (268 lb)   SpO2 96%   BMI 31.78 kg/m²   Smoking Status Every Day   BSA 2.57 m²     PHYSICAL EXAM   General appearance: Alert and in no acute distress. speech is clear and coherent  HEENT: Sclera and conjunctiva white, EOMI, uvela midline, no mouth lesions. PERRLA,  nasal turbinates are not swollen without exudate. TM's [place] with cone " of light, external ear canal with scant cerumen. No head trauma  Neck: no carotid bruits or thyromegaly. no lymphadenopathy   Respiratory : No respiratory distress, normal respiratory rhythm and effort. Clear bilateral breath sounds. No wheezing or rhonchi.   Cardiovascular: heart rate regular, S1, S2. no murmurs. no Lower extremity edema  Skin inspection: Normal skin color and pigmentation, normal skin turgor and no visible rash, induration, or cellulitis  MSK: 5/5 strength upper and lower extremities without gait abnormalities. no loss of muscle mass . Central spine is without pain. Right SI joint palpation with pain  Neuro: 2-12 CN grossly intact.  no slurred speech. no lateralizing deficit  Psychiatric Orientation: Oriented to person, place, and time. no depression, homicidal or suicidal thoughts, normal affect  Abdomen: soft, none tender, none distended. no organomegaly        REVIEW OF SYSTEMS   Constitutional: not feeling tired and no fever, chills or sweats. Denies weight loss    HEENT: no earache and no sore throat. no blurred vision and or double vision. no headache  Cardiovascular: no exertional chest pain, no palpitations, no lower extremity edema and no intermittent leg claudication.   Lungs: Denies shortness of breath, exertional dyspnea, wheezing  Gastrointestinal: no change in bowel habits, no diarrhea, no nausea, no vomiting and no abdominal pain. Denies Melena, brbpr or dark stool  Musculoskeletal: no myalgias, no muscle weakness and no limb swelling.   Skin: no rashes, no change in skin color and pigmentation and no skin lumps.   Neurological: no headaches, no seizures, no numbness, no lateralizing deficits and no fainting.   Psychiatric: no depression and no anxiety.   Urine: denies polyuria, hematuria, dysuria   Endocrine: no cold intolerance, no heat intolerance        No Known Allergies    Current Outpatient Medications   Medication Sig Dispense Refill    amLODIPine (Norvasc) 5 mg tablet TAKE  1 TABLET BY MOUTH EVERY DAY 90 tablet 3    atorvastatin (Lipitor) 10 mg tablet TAKE 1 TABLET BY MOUTH EVERY DAY 90 tablet 3    fluticasone (Flonase) 50 mcg/actuation nasal spray ADMINISTER 1 SPRAY INTO EACH NOSTRIL ONCE DAILY. 48 mL 1    loratadine (Claritin) 10 mg tablet Take 1 tablet (10 mg) by mouth once daily.      montelukast (Singulair) 10 mg tablet Take 1 tablet (10 mg) by mouth once daily at bedtime.      omeprazole (PriLOSEC) 20 mg DR capsule TAKE 1 CAPSULE BY MOUTH EVERY DAY 90 capsule 3     No current facility-administered medications for this visit.       Objective     No visits with results within 4 Month(s) from this visit.   Latest known visit with results is:   Orders Only on 11/03/2023   Component Date Value Ref Range Status    [facility] MCH 11/03/2023 32.4  27.0 - 34.0 pg Final    [facility] HCT 11/03/2023 46.2  41.0 - 52.0 % Final    [facility] Platelet 11/03/2023 205  150 - 450 x10 Final    [facility] RBC 11/03/2023 4.93  4.70 - 6.10 x10 Final    [facility] Instr WBC ND 11/03/2023 4.0   Final    [facility] MCHC 11/03/2023 34.6  32.0 - 37.0 g/dL Final    [facility] MCV 11/03/2023 93.6  80.0 - 100.0 fL Final    [facility] MPV 11/03/2023 8.1  7.4 - 10.4 fL Final    [facility] HGB 11/03/2023 16.0  13.5 - 17.5 g/dL Final    [facility] WBC 11/03/2023 4.0 (L)  4.5 - 11.0 x10 Final    [facility] RDW 11/03/2023 13.2  11.5 - 14.5 % Final    [facility] Vit D 25 11/03/2023 25  ng/mL Final    [facility] Globulin 11/03/2023 2.7  g/dL Final    [facility] Calcium 11/03/2023 9.6  8.7 - 10.4 mg/dL Final    [facility] GOT 11/03/2023 24  15 - 37 unit/L Final    [facility] Glucose 11/03/2023 106  74 - 106 mg/dL Final    [facility] BUN 11/03/2023 15  9 - 23 mg/dL Final    [facility] GFR AA 11/03/2023 >60   Final    [facility] Bilirubin, Total 11/03/2023 0.70  0.30 - 1.20 mg/dL Final    [facility] Na 11/03/2023 142  135 - 145 mmol/L Final    [facility] ALB 11/03/2023 4.3  3.4 - 5.0 g/dL Final    [facility] Chloride  11/03/2023 106  98 - 107 mmol/L Final    [facility] A/G Ratio 11/03/2023 1.6   Final    [facility] BUN/Creat Ratio 11/03/2023 13.6   Final    [facility] GPT 11/03/2023 37  10 - 49 unit/L Final    [facility] Creatinine 11/03/2023 1.1  0.6 - 1.1 mg/dL Final    [facility] Total Protein 11/03/2023 7.0  5.7 - 8.2 g/dL Final    [facility] CO2, venous 11/03/2023 27.0  20.0 - 31.0 mmol/L Final    [facility] Alk Phos 11/03/2023 56  45 - 117 unit/L Final    [facility] Glomerular Filtration R* 11/03/2023 >60  mL/min/1.73m? Final    [facility] Calculated Osmolality 11/03/2023 284  275 - 295 mOsm/kg Final    [facility] K 11/03/2023 4.8  3.5 - 5.1 mmol/L Final    [facility] TSH 11/03/2023 2.05  0.55 - 4.78 uIU/ml Final    [facility] HGB A1C 11/03/2023 5.1  % Final       Radiology: Reviewed imaging in powerchart.  No results found.    No family history on file.  Social History     Socioeconomic History    Marital status:    Tobacco Use    Smoking status: Every Day     Current packs/day: 0.25     Average packs/day: 0.3 packs/day for 25.0 years (6.3 ttl pk-yrs)     Types: Cigarettes    Smokeless tobacco: Never   Substance and Sexual Activity    Alcohol use: Yes     Alcohol/week: 10.0 standard drinks of alcohol     Types: 10 Standard drinks or equivalent per week    Drug use: Never     Social Drivers of Health     Financial Resource Strain: Low Risk  (6/27/2022)    Received from [facility]    Overall Financial Resource Strain (CARDIA)     Difficulty of Paying Living Expenses: Not hard at all   Food Insecurity: No Food Insecurity (12/29/2022)    Received from [facility]    Hunger Vital Sign     Worried About Running Out of Food in the Last Year: Never true     Ran Out of Food in the Last Year: Never true   Transportation Needs: No Transportation Needs (12/29/2022)    [facility] - Transportation     Lack of Transportation (Medical):  No     Lack of Transportation (Non-Medical): No   Physical Activity: Sufficiently Active (12/29/2022)    Received from [facility]    Exercise Vital Sign     Days of Exercise per Week: 7 days     Minutes of Exercise per Session: 30 min   Stress: No Stress Concern Present (12/29/2022)    Received from [facility] - Occupational Stress Questionnaire     Feeling of Stress : Not at all   Social Connections: Socially Integrated (12/29/2022)    Received from [facility]    Social Connection and Isolation Panel [NHANES]     Frequency of Communication with Friends and Family: More than three times a week     Frequency of Social Gatherings with Friends and Family: Once a week     Attends Yarsani Services: More than 4 times per year     Attends Club or Organization Meetings: More than 4 times per year     Marital Status:    Housing Stability: Low Risk  (12/29/2022)    Received from [facility]    Housing Stability Vital Sign     Unable to Pay for Housing in the Last Year: No     Number of Places Lived in the Last Year: 1     Unstable Housing in the Last Year: No     No past medical history on file.  No past surgical history on file.    Charting was completed using voice recognition technology and may include unintended errors.

## 2025-02-07 LAB
25(OH)D3+25(OH)D2 SERPL-MCNC: 36 NG/ML (ref 30–100)
ALBUMIN SERPL-MCNC: 4.9 G/DL (ref 3.6–5.1)
ALP SERPL-CCNC: 44 U/L (ref 36–130)
ALT SERPL-CCNC: 21 U/L (ref 9–46)
ANION GAP SERPL CALCULATED.4IONS-SCNC: 8 MMOL/L (CALC) (ref 7–17)
AST SERPL-CCNC: 18 U/L (ref 10–40)
BILIRUB SERPL-MCNC: 1.3 MG/DL (ref 0.2–1.2)
BUN SERPL-MCNC: 15 MG/DL (ref 7–25)
CALCIUM SERPL-MCNC: 9.8 MG/DL (ref 8.6–10.3)
CHLORIDE SERPL-SCNC: 103 MMOL/L (ref 98–110)
CHOLEST SERPL-MCNC: 179 MG/DL
CHOLEST/HDLC SERPL: 4.3 (CALC)
CO2 SERPL-SCNC: 27 MMOL/L (ref 20–32)
CREAT SERPL-MCNC: 1.05 MG/DL (ref 0.6–1.29)
EGFRCR SERPLBLD CKD-EPI 2021: 88 ML/MIN/1.73M2
ERYTHROCYTE [DISTWIDTH] IN BLOOD BY AUTOMATED COUNT: 12.8 % (ref 11–15)
GLUCOSE SERPL-MCNC: 91 MG/DL (ref 65–99)
HCT VFR BLD AUTO: 48.3 % (ref 38.5–50)
HDLC SERPL-MCNC: 42 MG/DL
HGB BLD-MCNC: 16.6 G/DL (ref 13.2–17.1)
LDLC SERPL CALC-MCNC: 117 MG/DL (CALC)
MCH RBC QN AUTO: 32.3 PG (ref 27–33)
MCHC RBC AUTO-ENTMCNC: 34.4 G/DL (ref 32–36)
MCV RBC AUTO: 94 FL (ref 80–100)
NONHDLC SERPL-MCNC: 137 MG/DL (CALC)
PLATELET # BLD AUTO: 216 THOUSAND/UL (ref 140–400)
PMV BLD REES-ECKER: 10.3 FL (ref 7.5–12.5)
POTASSIUM SERPL-SCNC: 4.5 MMOL/L (ref 3.5–5.3)
PROT SERPL-MCNC: 7.2 G/DL (ref 6.1–8.1)
RBC # BLD AUTO: 5.14 MILLION/UL (ref 4.2–5.8)
SODIUM SERPL-SCNC: 138 MMOL/L (ref 135–146)
TRIGL SERPL-MCNC: 94 MG/DL
TSH SERPL-ACNC: 1.91 MIU/L (ref 0.4–4.5)
WBC # BLD AUTO: 3.7 THOUSAND/UL (ref 3.8–10.8)

## 2025-02-10 DIAGNOSIS — D72.819 LEUKOPENIA, UNSPECIFIED TYPE: Primary | ICD-10-CM

## 2025-02-12 ENCOUNTER — TELEPHONE (OUTPATIENT)
Dept: PRIMARY CARE | Facility: CLINIC | Age: 50
End: 2025-02-12
Payer: COMMERCIAL

## 2025-02-12 NOTE — TELEPHONE ENCOUNTER
----- Message from Art Romero sent at 2/10/2025  8:46 AM EST -----  White count slightly low. Probably lab error. Recommend repeating cbc in the next week

## 2025-02-14 LAB
BASOPHILS # BLD AUTO: 59 CELLS/UL (ref 0–200)
BASOPHILS NFR BLD AUTO: 1.3 %
EOSINOPHIL # BLD AUTO: 122 CELLS/UL (ref 15–500)
EOSINOPHIL NFR BLD AUTO: 2.7 %
ERYTHROCYTE [DISTWIDTH] IN BLOOD BY AUTOMATED COUNT: 12.8 % (ref 11–15)
HCT VFR BLD AUTO: 50.6 % (ref 38.5–50)
HGB BLD-MCNC: 17.1 G/DL (ref 13.2–17.1)
LYMPHOCYTES # BLD AUTO: 1296 CELLS/UL (ref 850–3900)
LYMPHOCYTES NFR BLD AUTO: 28.8 %
MCH RBC QN AUTO: 31.8 PG (ref 27–33)
MCHC RBC AUTO-ENTMCNC: 33.8 G/DL (ref 32–36)
MCV RBC AUTO: 94.2 FL (ref 80–100)
MONOCYTES # BLD AUTO: 360 CELLS/UL (ref 200–950)
MONOCYTES NFR BLD AUTO: 8 %
NEUTROPHILS # BLD AUTO: 2664 CELLS/UL (ref 1500–7800)
NEUTROPHILS NFR BLD AUTO: 59.2 %
PLATELET # BLD AUTO: 214 THOUSAND/UL (ref 140–400)
PMV BLD REES-ECKER: 10.6 FL (ref 7.5–12.5)
RBC # BLD AUTO: 5.37 MILLION/UL (ref 4.2–5.8)
WBC # BLD AUTO: 4.5 THOUSAND/UL (ref 3.8–10.8)

## 2025-03-15 DIAGNOSIS — I10 HYPERTENSION, UNSPECIFIED TYPE: ICD-10-CM

## 2025-03-18 RX ORDER — AMLODIPINE BESYLATE 5 MG/1
5 TABLET ORAL DAILY
Qty: 90 TABLET | Refills: 3 | Status: SHIPPED | OUTPATIENT
Start: 2025-03-18

## 2025-03-18 RX ORDER — ATORVASTATIN CALCIUM 10 MG/1
10 TABLET, FILM COATED ORAL DAILY
Qty: 90 TABLET | Refills: 3 | Status: SHIPPED | OUTPATIENT
Start: 2025-03-18

## 2025-04-03 DIAGNOSIS — J30.2 SEASONAL ALLERGIES: ICD-10-CM

## 2025-04-04 RX ORDER — LORATADINE 10 MG/1
10 TABLET ORAL DAILY
Qty: 90 TABLET | Refills: 3 | Status: SHIPPED | OUTPATIENT
Start: 2025-04-04

## 2025-04-28 DIAGNOSIS — K64.9 HEMORRHOIDS, UNSPECIFIED HEMORRHOID TYPE: Primary | ICD-10-CM

## 2025-04-28 RX ORDER — PRAMOXINE HYDROCHLORIDE HYDROCORTISONE ACETATE 100; 100 MG/10G; MG/10G
1 AEROSOL, FOAM TOPICAL 2 TIMES DAILY
Qty: 10 G | Refills: 3 | Status: SHIPPED | OUTPATIENT
Start: 2025-04-28

## 2026-01-30 ENCOUNTER — APPOINTMENT (OUTPATIENT)
Dept: PRIMARY CARE | Facility: CLINIC | Age: 51
End: 2026-01-30
Payer: COMMERCIAL